# Patient Record
Sex: FEMALE | Race: WHITE | NOT HISPANIC OR LATINO | Employment: UNEMPLOYED | URBAN - METROPOLITAN AREA
[De-identification: names, ages, dates, MRNs, and addresses within clinical notes are randomized per-mention and may not be internally consistent; named-entity substitution may affect disease eponyms.]

---

## 2018-04-18 PROBLEM — I50.22 CHF (CONGESTIVE HEART FAILURE), NYHA CLASS III, CHRONIC, SYSTOLIC: Status: ACTIVE | Noted: 2018-04-18

## 2018-04-18 PROBLEM — I25.118 CORONARY ARTERY DISEASE OF NATIVE ARTERY OF NATIVE HEART WITH STABLE ANGINA PECTORIS: Status: ACTIVE | Noted: 2018-04-18

## 2018-04-18 PROBLEM — I25.5 ISCHEMIC CARDIOMYOPATHY: Status: ACTIVE | Noted: 2018-04-18

## 2018-04-19 PROBLEM — Z78.9 KNOWLEDGE DEFICIT ABOUT THERAPEUTIC DIET: Status: ACTIVE | Noted: 2018-04-19

## 2018-04-20 PROBLEM — Z78.9 KNOWLEDGE DEFICIT ABOUT THERAPEUTIC DIET: Status: ACTIVE | Noted: 2018-04-20

## 2020-08-28 ENCOUNTER — TELEPHONE (OUTPATIENT)
Dept: SURGERY | Facility: CLINIC | Age: 85
End: 2020-08-28

## 2020-08-31 ENCOUNTER — OFFICE VISIT (OUTPATIENT)
Dept: SURGERY | Facility: CLINIC | Age: 85
End: 2020-08-31
Payer: MEDICARE

## 2020-08-31 ENCOUNTER — TELEPHONE (OUTPATIENT)
Dept: SURGERY | Facility: CLINIC | Age: 85
End: 2020-08-31

## 2020-08-31 VITALS
SYSTOLIC BLOOD PRESSURE: 118 MMHG | WEIGHT: 174.31 LBS | HEIGHT: 63 IN | BODY MASS INDEX: 30.89 KG/M2 | DIASTOLIC BLOOD PRESSURE: 56 MMHG

## 2020-08-31 DIAGNOSIS — K64.8 HEMORRHOIDS, INTERNAL, WITH BLEEDING: Primary | ICD-10-CM

## 2020-08-31 DIAGNOSIS — K64.4 EXTERNAL HEMORRHOIDS: ICD-10-CM

## 2020-08-31 PROCEDURE — 99203 PR OFFICE/OUTPT VISIT, NEW, LEVL III, 30-44 MIN: ICD-10-PCS | Mod: 25,S$PBB,, | Performed by: COLON & RECTAL SURGERY

## 2020-08-31 PROCEDURE — 99203 OFFICE O/P NEW LOW 30 MIN: CPT | Mod: 25,S$PBB,, | Performed by: COLON & RECTAL SURGERY

## 2020-08-31 PROCEDURE — 99213 OFFICE O/P EST LOW 20 MIN: CPT | Mod: PBBFAC | Performed by: COLON & RECTAL SURGERY

## 2020-08-31 PROCEDURE — 45330 DIAGNOSTIC SIGMOIDOSCOPY: CPT | Mod: S$PBB,,, | Performed by: COLON & RECTAL SURGERY

## 2020-08-31 PROCEDURE — 45330 DIAGNOSTIC SIGMOIDOSCOPY: CPT | Mod: PBBFAC | Performed by: COLON & RECTAL SURGERY

## 2020-08-31 PROCEDURE — 99999 PR PBB SHADOW E&M-EST. PATIENT-LVL III: ICD-10-PCS | Mod: PBBFAC,,, | Performed by: COLON & RECTAL SURGERY

## 2020-08-31 PROCEDURE — 99999 PR PBB SHADOW E&M-EST. PATIENT-LVL III: CPT | Mod: PBBFAC,,, | Performed by: COLON & RECTAL SURGERY

## 2020-08-31 PROCEDURE — 45330 PR SIGMOIDOSCOPY,DIAG2STIC: ICD-10-PCS | Mod: S$PBB,,, | Performed by: COLON & RECTAL SURGERY

## 2020-08-31 RX ORDER — CARVEDILOL 6.25 MG/1
6.25 TABLET ORAL 2 TIMES DAILY
COMMUNITY
Start: 2020-07-10

## 2020-08-31 RX ORDER — FAMOTIDINE 20 MG/1
TABLET, FILM COATED ORAL
COMMUNITY
Start: 2016-10-21 | End: 2020-08-31

## 2020-08-31 RX ORDER — ATORVASTATIN CALCIUM 20 MG/1
TABLET, FILM COATED ORAL
COMMUNITY
Start: 2016-11-30 | End: 2020-08-31

## 2020-08-31 RX ORDER — SACUBITRIL AND VALSARTAN 49; 51 MG/1; MG/1
1 TABLET, FILM COATED ORAL 2 TIMES DAILY
COMMUNITY
Start: 2020-07-25

## 2020-08-31 RX ORDER — FUROSEMIDE 40 MG/1
40 TABLET ORAL
COMMUNITY
Start: 2016-10-21

## 2020-08-31 RX ORDER — MEMANTINE HYDROCHLORIDE 10 MG/1
10 TABLET ORAL 2 TIMES DAILY
COMMUNITY
Start: 2020-06-03

## 2020-08-31 NOTE — TELEPHONE ENCOUNTER
Spoke with patient's daughter, informed her of appointment change to 9/14. Permission to overbook per Dr Jones .

## 2020-08-31 NOTE — PROGRESS NOTES
Subjective:       Patient ID: Heather Bryant is a 85 y.o. female.    Chief Complaint: Hemorrhoids    HPI  84 yo F, presents with complains of intermittent painless bright red blood per rectum after bowel movements.  She has a significant past cardiac history including CAD s/p CABG, CHF, carotid artery stenosis, aortic stenosis - on Plavix and ASA 81 mg/day.  + constipation.  Poor fiber intake.  No abdominal pain, unexplained weight loss, anorexia, recent change in bowel habits, or other constitutional symptoms.       Review of patient's allergies indicates:   Allergen Reactions    Codeine Hives    Iodinated contrast media Hives       Past Medical History:   Diagnosis Date    Abnormal EKG     Anxiety     Aortic valve stenosis     Arthritis     ASCVD (arteriosclerotic cardiovascular disease)     Carotid artery stenosis     Bilateral    CHF (congestive heart failure)     Coronary artery disease     Diabetes mellitus     Edema     Encounter for blood transfusion     HHD (hypertensive heart disease)     HLD (hyperlipidemia)     Hyperkalemia     Hypertension     IDDM (insulin dependent diabetes mellitus)     Ischemic cardiomyopathy     MI, old 2016    x2    Nonrheumatic mitral valve regurgitation     Nonrheumatic tricuspid valve regurgitation        Past Surgical History:   Procedure Laterality Date    BACK SURGERY      CARPAL TUNNEL RELEASE Bilateral     CATARACT EXTRACTION W/ INTRAOCULAR LENS  IMPLANT, BILATERAL      CHOLECYSTECTOMY      CORONARY ARTERY BYPASS GRAFT      x3    drug eluding coronary stent placement      HERNIA REPAIR      HYSTERECTOMY      SHOULDER ARTHROSCOPY W/ ROTATOR CUFF REPAIR Bilateral        Current Outpatient Medications   Medication Sig Dispense Refill    aspirin (ECOTRIN) 81 MG EC tablet Take 81 mg by mouth once daily.      atorvastatin (LIPITOR) 40 MG tablet Take 40 mg by mouth every evening.      clopidogrel (PLAVIX) 75 mg tablet Take 75 mg by mouth  once daily.      escitalopram oxalate (LEXAPRO) 20 MG tablet Take 20 mg by mouth once daily.      famotidine (PEPCID) 20 MG tablet Take 20 mg by mouth once daily.      furosemide (LASIX) 40 MG tablet Take 40 mg by mouth.      insulin aspart protamine-insulin aspart (NOVOLOG 70/30) 100 unit/mL (70-30) InPn pen Inject 24 Units into the skin 3 (three) times daily before meals.      insulin degludec 100 unit/mL (3 mL) InPn Inject 76 Units into the skin once daily.       isosorbide mononitrate (IMDUR) 30 MG 24 hr tablet Take 30 mg by mouth once daily.      MULTIVIT-IRON-MIN-FOLIC ACID 3,500-18-0.4 UNIT-MG-MG ORAL CHEW Take 1 capsule by mouth once daily.      nitroGLYCERIN (NITROSTAT) 0.4 MG SL tablet Place 0.4 mg under the tongue every 5 (five) minutes as needed for Chest pain. Do not exceed 3 doses in 15 min, call [facility] after third consecutive dose.      carvediloL (COREG) 6.25 MG tablet Take 6.25 mg by mouth 2 (two) times daily.      cephALEXin (KEFLEX) 250 MG capsule Take 1 capsule (250 mg total) by mouth every 8 (eight) hours. (Patient not taking: Reported on 8/31/2020)      ENTRESTO 49-51 mg per tablet Take 1 tablet by mouth 2 (two) times daily.      magnesium L-lactate (MAGTAB) 84 mg TbSR Take 84 mg by mouth 2 (two) times daily.      memantine (NAMENDA) 10 MG Tab Take 10 mg by mouth 2 (two) times daily.       No current facility-administered medications for this visit.        Family History   Problem Relation Age of Onset    Hypertension Mother     Cancer Father     Diabetes Father     Stroke Sister     Colon cancer Sister     Cancer Sister     Brain cancer Brother     Cancer Brother     Cancer Brother        Social History     Socioeconomic History    Marital status:      Spouse name: Not on file    Number of children: Not on file    Years of education: Not on file    Highest education level: Not on file   Occupational History    Not on file   Social Needs    Financial resource  strain: Not on file    Food insecurity     Worry: Not on file     Inability: Not on file    Transportation needs     Medical: Not on file     Non-medical: Not on file   Tobacco Use    Smoking status: Never Smoker    Smokeless tobacco: Never Used   Substance and Sexual Activity    Alcohol use: No    Drug use: Not on file    Sexual activity: Not on file   Lifestyle    Physical activity     Days per week: Not on file     Minutes per session: Not on file    Stress: Not on file   Relationships    Social connections     Talks on phone: Not on file     Gets together: Not on file     Attends Buddhism service: Not on file     Active member of club or organization: Not on file     Attends meetings of clubs or organizations: Not on file     Relationship status: Not on file   Other Topics Concern    Not on file   Social History Narrative    Not on file       Review of Systems   Constitutional: Negative for chills and fever.   HENT: Negative for congestion and sore throat.    Eyes: Negative for visual disturbance.   Respiratory: Negative for cough and shortness of breath.    Cardiovascular: Negative for chest pain and palpitations.   Gastrointestinal: Positive for anal bleeding and constipation. Negative for abdominal distention, abdominal pain, blood in stool, diarrhea, nausea, rectal pain and vomiting.   Endocrine: Negative for cold intolerance and heat intolerance.   Genitourinary: Negative for dysuria and frequency.   Musculoskeletal: Negative for arthralgias, back pain and neck pain.   Skin: Negative for rash.   Allergic/Immunologic: Negative for immunocompromised state.   Neurological: Negative for dizziness, light-headedness and headaches.   Hematological: Does not bruise/bleed easily.   Psychiatric/Behavioral: Negative for confusion. The patient is not nervous/anxious.        Objective:      Physical Exam  Constitutional:       Appearance: She is well-developed.   HENT:      Head: Normocephalic.   Pulmonary:       Effort: Pulmonary effort is normal. No respiratory distress.   Abdominal:      General: Bowel sounds are normal. There is no distension.      Palpations: Abdomen is soft. There is no mass.      Tenderness: There is no abdominal tenderness. There is no guarding or rebound.   Genitourinary:     Comments: Perineum - normal perianal skin, no mass, no fissure, small circumferential external hemorrhoids without thrombosis/inflammation, +internal hemorrhoids visible with effacement of anal verge  ATIYA - good tone, no mass, firm stool in rectal vault  Anoscopy - Grade 2-3 internal hemorrhoids with surface inflammation/friability  Musculoskeletal: Normal range of motion.   Skin:     General: Skin is warm and dry.   Neurological:      Mental Status: She is alert and oriented to person, place, and time.           Lab Results   Component Value Date    WBC 10.00 04/18/2018    HGB 11.7 (L) 04/18/2018    HCT 35.6 (L) 04/18/2018    MCV 81 (L) 04/18/2018     04/18/2018     BMP  Lab Results   Component Value Date     04/18/2018    K 5.6 (H) 04/18/2018     04/18/2018    CO2 23 04/18/2018    BUN 57 (H) 04/18/2018    CREATININE 1.70 (H) 04/18/2018    CALCIUM 9.5 04/18/2018    ESTGFRAFRICA 32 (A) 04/18/2018    EGFRNONAA 27 (A) 04/18/2018     CMP  Sodium   Date Value Ref Range Status   04/18/2018 140 136 - 145 mmol/L Final     Potassium   Date Value Ref Range Status   04/18/2018 5.6 (H) 3.5 - 5.1 mmol/L Final     Chloride   Date Value Ref Range Status   04/18/2018 101 95 - 110 mmol/L Final     CO2   Date Value Ref Range Status   04/18/2018 23 23 - 29 mmol/L Final     Glucose   Date Value Ref Range Status   04/18/2018 371 (H) 74 - 106 mg/dL Final     BUN, Bld   Date Value Ref Range Status   04/18/2018 57 (H) 7 - 17 mg/dL Final     Creatinine   Date Value Ref Range Status   04/18/2018 1.70 (H) 0.70 - 1.20 mg/dL Final     Calcium   Date Value Ref Range Status   04/18/2018 9.5 8.4 - 10.2 mg/dL Final     Total Protein    Date Value Ref Range Status   04/18/2018 7.7 6.3 - 8.2 g/dL Final     Albumin   Date Value Ref Range Status   04/18/2018 4.3 3.5 - 5.2 g/dL Final     Total Bilirubin   Date Value Ref Range Status   04/18/2018 0.6 0.2 - 1.3 mg/dL Final     Alkaline Phosphatase   Date Value Ref Range Status   04/18/2018 103 38 - 145 U/L Final     AST   Date Value Ref Range Status   04/18/2018 29 14 - 36 U/L Final     ALT   Date Value Ref Range Status   04/18/2018 30 10 - 44 U/L Final     eGFR if    Date Value Ref Range Status   04/18/2018 32 (A) >60 mL/min/1.73 m^2 Final     eGFR if non    Date Value Ref Range Status   04/18/2018 27 (A) >60 mL/min/1.73 m^2 Final     Comment:     Calculation used to obtain the estimated glomerular filtration  rate (eGFR) is the CKD-EPI equation.        No results found for: CEA        Assessment:       1. Hemorrhoids, internal, with bleeding    2. External hemorrhoids        Plan:   The internal hemorrhoids appear amenable to EBL, though she would need to be off her Plavix for several days prior.  I have tentatively scheduled her for a repeat office visit with EBL on 09/14/2020.  I asked her to be sure with her cardiologist that it is OK to hold her Plavix for 5 days preop, which she should do on 09/09/2020.    Rio Jones MD, FACS, FASCRS  Senior Staff Surgeon  Department of Colon & Rectal Surgery     This note was created using voice recognition software, and may contain some unrecognized transcriptional errors.

## 2020-09-10 ENCOUNTER — TELEPHONE (OUTPATIENT)
Dept: SURGERY | Facility: CLINIC | Age: 85
End: 2020-09-10

## 2020-09-10 NOTE — TELEPHONE ENCOUNTER
Spoke with patient's daughter, patient is currently admitted at [facility] for staph infection on arm, 9/14 appointment cancelled and rescheduled to 9/21. Daughter is concerned patient may not be well enough by then to tolerate visit. Encouraged daughter to call me back next Wednesday 9/16 with an update on her mother to reevaluate her condition and will reschedule or keep appointment based on update.

## 2020-09-16 ENCOUNTER — TELEPHONE (OUTPATIENT)
Dept: SURGERY | Facility: CLINIC | Age: 85
End: 2020-09-16

## 2020-09-16 NOTE — TELEPHONE ENCOUNTER
Spoke with patient's daughter, Divya. States that phone staff was able to help her rescheduled office visit and that patient is still in the hospital. Informed patient that we received a fax from her cardiologist that it is okay to hold her Plavix for 5 days prior to rubber band ligation and 7 days afterwards. If she keeps her October 22nd appointment she would need to hold the Plavix starting on October 17th. Stated understanding.

## 2020-10-22 ENCOUNTER — OFFICE VISIT (OUTPATIENT)
Dept: SURGERY | Facility: CLINIC | Age: 85
End: 2020-10-22
Payer: MEDICARE

## 2020-10-22 VITALS
TEMPERATURE: 98 F | BODY MASS INDEX: 31.02 KG/M2 | HEART RATE: 74 BPM | DIASTOLIC BLOOD PRESSURE: 68 MMHG | SYSTOLIC BLOOD PRESSURE: 106 MMHG | WEIGHT: 175.06 LBS | HEIGHT: 63 IN | RESPIRATION RATE: 18 BRPM

## 2020-10-22 DIAGNOSIS — K64.8 HEMORRHOIDS, INTERNAL, WITH BLEEDING: Primary | ICD-10-CM

## 2020-10-22 DIAGNOSIS — Z01.818 PRE-OP TESTING: Primary | ICD-10-CM

## 2020-10-22 PROCEDURE — 99999 PR PBB SHADOW E&M-EST. PATIENT-LVL IV: CPT | Mod: PBBFAC,,, | Performed by: COLON & RECTAL SURGERY

## 2020-10-22 PROCEDURE — 46221 LIGATION OF HEMORRHOID(S): CPT | Mod: S$PBB | Performed by: COLON & RECTAL SURGERY

## 2020-10-22 PROCEDURE — 99214 OFFICE O/P EST MOD 30 MIN: CPT | Mod: PBBFAC | Performed by: COLON & RECTAL SURGERY

## 2020-10-22 PROCEDURE — 99999 PR STA SHADOW: ICD-10-PCS | Mod: S$PBB,PBBFAC,, | Performed by: COLON & RECTAL SURGERY

## 2020-10-22 PROCEDURE — 99213 OFFICE O/P EST LOW 20 MIN: CPT | Mod: 25,S$PBB | Performed by: COLON & RECTAL SURGERY

## 2020-10-22 PROCEDURE — 99999 PR STA SHADOW: CPT | Mod: PBBFAC,,, | Performed by: COLON & RECTAL SURGERY

## 2020-10-22 RX ORDER — LISINOPRIL 20 MG/1
20 TABLET ORAL
COMMUNITY

## 2020-10-22 RX ORDER — ONDANSETRON 4 MG/1
TABLET, ORALLY DISINTEGRATING ORAL
COMMUNITY
Start: 2020-09-17

## 2020-10-22 RX ORDER — METOPROLOL TARTRATE 25 MG/1
25 TABLET, FILM COATED ORAL
COMMUNITY

## 2020-10-22 RX ORDER — INSULIN ASPART 100 [IU]/ML
INJECTION, SOLUTION INTRAVENOUS; SUBCUTANEOUS
COMMUNITY
Start: 2020-09-23 | End: 2021-03-04 | Stop reason: SDUPTHER

## 2020-10-22 RX ORDER — PATIROMER 8.4 G/1
POWDER, FOR SUSPENSION ORAL
COMMUNITY
Start: 2020-10-19

## 2020-10-22 RX ORDER — PANTOPRAZOLE SODIUM 40 MG/1
40 TABLET, DELAYED RELEASE ORAL DAILY
COMMUNITY
Start: 2020-09-10

## 2020-10-22 NOTE — LETTER
October 22, 2020      David Haines MD  [address] [place] [address]           [facility]  [address] [facility] [address]  Phone: [phone]          Patient: Heather Bryant   MR Number: [id number]   YOB: 1935   Date of Visit: 10/22/2020       Dear Dr Haines:    Thank you for referring Heather Bryant to me for evaluation. Attached you will find relevant portions of my assessment and plan of care.    If you have questions, please do not hesitate to call me. I look forward to following Heather Bryant along with you.    Sincerely,    Rio Jones MD    Enclosure  CC:  No Recipients    If you would like to receive this communication electronically, please contact [email or web address] or [phone] to request more information on [employer] access.    For providers and/or their staff who would like to refer a patient to [facility], please contact us through our one-stop-shop provider referral line, [facility] , at [phone].    If you feel you have received this communication in error or would no longer like to receive these types of communications, please e-mail [email or web address]

## 2020-10-22 NOTE — PROGRESS NOTES
Subjective:       Patient ID: Heather Bryant is a 85 y.o. female.    Chief Complaint: Hemorrhoids    HPI  86 yo F, seen 8/31/2020 with complains of intermittent painless bright red blood per rectum after bowel movements.  She has a significant past cardiac history including CAD s/p CABG, CHF, carotid artery stenosis, aortic stenosis - on Plavix and ASA 81 mg/day.  + constipation.  Poor fiber intake.  No abdominal pain, unexplained weight loss, anorexia, recent change in bowel habits, or other constitutional symptoms.   On exam she had large grade 2-3 internal hemorrhoids with significant mucosal inflammation/contact friability.  The internal hemorrhoids appeared amenable to EBL, though she would need to be off her Plavix for several days prior.  I tentatively scheduled her for a repeat office visit with EBL on 09/14/2020.  I asked her to be sure with her cardiologist that it is OK to hold her Plavix for 5 days preop, which she should do on 09/09/2020.    Unfortunately, she had to cancel this appointment because she was hospitalized with a staph infection in her arm that required surgery.  She has now recovered from this and returns today to undergo banding.  She is still experiencing intermittent rectal bleeding, though somewhat improved from prior.  She did get clearance from her cardiologist to hold her Plavix - she last took it 5 days ago.      Review of patient's allergies indicates:   Allergen Reactions    Codeine Hives    Iodinated contrast media Hives       Past Medical History:   Diagnosis Date    Abnormal EKG     Anxiety     Aortic valve stenosis     Arthritis     ASCVD (arteriosclerotic cardiovascular disease)     Carotid artery stenosis     Bilateral    CHF (congestive heart failure)     Coronary artery disease     Diabetes mellitus     Edema     Encounter for blood transfusion     HHD (hypertensive heart disease)     HLD (hyperlipidemia)     Hyperkalemia     Hypertension     IDDM  (insulin dependent diabetes mellitus)     Ischemic cardiomyopathy     MI, old 2016    x2    Nonrheumatic mitral valve regurgitation     Nonrheumatic tricuspid valve regurgitation        Past Surgical History:   Procedure Laterality Date    BACK SURGERY      CARPAL TUNNEL RELEASE Bilateral     CATARACT EXTRACTION W/ INTRAOCULAR LENS  IMPLANT, BILATERAL      CHOLECYSTECTOMY      CORONARY ARTERY BYPASS GRAFT      x3    drug eluding coronary stent placement      HERNIA REPAIR      HYSTERECTOMY      SHOULDER ARTHROSCOPY W/ ROTATOR CUFF REPAIR Bilateral        Current Outpatient Medications   Medication Sig Dispense Refill    aspirin (ECOTRIN) 81 MG EC tablet Take 81 mg by mouth once daily.      atorvastatin (LIPITOR) 40 MG tablet Take 40 mg by mouth every evening.      carvediloL (COREG) 6.25 MG tablet Take 6.25 mg by mouth 2 (two) times daily.      ENTRESTO 49-51 mg per tablet Take 1 tablet by mouth 2 (two) times daily.      escitalopram oxalate (LEXAPRO) 20 MG tablet Take 20 mg by mouth once daily.      famotidine (PEPCID) 20 MG tablet Take 20 mg by mouth once daily.      furosemide (LASIX) 40 MG tablet Take 40 mg by mouth.      insulin aspart protamine-insulin aspart (NOVOLOG 70/30) 100 unit/mL (70-30) InPn pen Inject 24 Units into the skin 3 (three) times daily before meals.      isosorbide mononitrate (IMDUR) 30 MG 24 hr tablet Take 30 mg by mouth once daily.      lisinopriL (PRINIVIL,ZESTRIL) 20 MG tablet Take 20 mg by mouth.      memantine (NAMENDA) 10 MG Tab Take 10 mg by mouth 2 (two) times daily.      metoprolol tartrate (LOPRESSOR) 25 MG tablet Take 25 mg by mouth.      MULTIVIT-IRON-MIN-FOLIC ACID 3,500-18-0.4 UNIT-MG-MG ORAL CHEW Take 1 capsule by mouth once daily.      nitroGLYCERIN (NITROSTAT) 0.4 MG SL tablet Place 0.4 mg under the tongue every 5 (five) minutes as needed for Chest pain. Do not exceed 3 doses in 15 min, call [facility] after third consecutive dose.      NOVOLOG  FLEXPEN U-100 INSULIN 100 unit/mL (3 mL) InPn pen INJECT 10 15 UNITS BEFORE MEALS PENDING MEAL SIZE SUBCUTANEOUS      ondansetron (ZOFRAN-ODT) 4 MG TbDL DISSOLVE ON TONGUE 1 TABLET EVERY 8 HOURS AS NEEDED FOR NAUSEA AND VOMITING      pantoprazole (PROTONIX) 40 MG tablet Take 40 mg by mouth once daily.      VELTASSA 8.4 gram PwPk DISSOLVE THE CONTENTS OF 1 PACKET IN 1/3 CUP OF  WATER AND DRINK ONCE A DAY      clopidogrel (PLAVIX) 75 mg tablet Take 75 mg by mouth once daily.      insulin degludec 100 unit/mL (3 mL) InPn Inject 76 Units into the skin once daily.       magnesium L-lactate (MAGTAB) 84 mg TbSR Take 84 mg by mouth 2 (two) times daily.       No current facility-administered medications for this visit.        Family History   Problem Relation Age of Onset    Hypertension Mother     Cancer Father     Diabetes Father     Stroke Sister     Colon cancer Sister     Cancer Sister     Brain cancer Brother     Cancer Brother     Cancer Brother        Social History     Socioeconomic History    Marital status:      Spouse name: Not on file    Number of children: Not on file    Years of education: Not on file    Highest education level: Not on file   Occupational History    Not on file   Social Needs    Financial resource strain: Not on file    Food insecurity     Worry: Not on file     Inability: Not on file    Transportation needs     Medical: Not on file     Non-medical: Not on file   Tobacco Use    Smoking status: Never Smoker    Smokeless tobacco: Never Used   Substance and Sexual Activity    Alcohol use: No    Drug use: Not on file    Sexual activity: Not on file   Lifestyle    Physical activity     Days per week: Not on file     Minutes per session: Not on file    Stress: Not on file   Relationships    Social connections     Talks on phone: Not on file     Gets together: Not on file     Attends Tenriism service: Not on file     Active member of club or organization: Not on  file     Attends meetings of clubs or organizations: Not on file     Relationship status: Not on file   Other Topics Concern    Not on file   Social History Narrative    Not on file       Review of Systems   Constitutional: Negative for chills and fever.   HENT: Negative for congestion and sore throat.    Eyes: Negative for visual disturbance.   Respiratory: Negative for cough and shortness of breath.    Cardiovascular: Negative for chest pain and palpitations.   Gastrointestinal: Positive for anal bleeding and constipation. Negative for abdominal distention, abdominal pain, blood in stool, diarrhea, nausea, rectal pain and vomiting.   Endocrine: Negative for cold intolerance and heat intolerance.   Genitourinary: Negative for dysuria and frequency.   Musculoskeletal: Negative for arthralgias, back pain and neck pain.   Skin: Negative for rash.   Allergic/Immunologic: Negative for immunocompromised state.   Neurological: Negative for dizziness, light-headedness and headaches.   Hematological: Does not bruise/bleed easily.   Psychiatric/Behavioral: Negative for confusion. The patient is not nervous/anxious.        Objective:      Physical Exam  Constitutional:       Appearance: She is well-developed.   HENT:      Head: Normocephalic.   Pulmonary:      Effort: Pulmonary effort is normal. No respiratory distress.   Abdominal:      General: Bowel sounds are normal. There is no distension.      Palpations: Abdomen is soft. There is no mass.      Tenderness: There is no abdominal tenderness. There is no guarding or rebound.   Genitourinary:     Comments: Perineum - normal perianal skin, no mass, no fissure, small circumferential external hemorrhoids without thrombosis/inflammation, +internal hemorrhoids visible with effacement of anal verge  ATIYA - good tone, no mass, firm stool in rectal vault  Anoscopy - Grade 2 internal hemorrhoids with improvement in surface inflammation/friability  Musculoskeletal: Normal range of  motion.   Skin:     General: Skin is warm and dry.   Neurological:      Mental Status: She is alert and oriented to person, place, and time.           Lab Results   Component Value Date    WBC 10.00 04/18/2018    HGB 11.7 (L) 04/18/2018    HCT 35.6 (L) 04/18/2018    MCV 81 (L) 04/18/2018     04/18/2018     BMP  Lab Results   Component Value Date     04/18/2018    K 5.6 (H) 04/18/2018     04/18/2018    CO2 23 04/18/2018    BUN 57 (H) 04/18/2018    CREATININE 1.70 (H) 04/18/2018    CALCIUM 9.5 04/18/2018    ESTGFRAFRICA 32 (A) 04/18/2018    EGFRNONAA 27 (A) 04/18/2018     CMP  Sodium   Date Value Ref Range Status   04/18/2018 140 136 - 145 mmol/L Final     Potassium   Date Value Ref Range Status   04/18/2018 5.6 (H) 3.5 - 5.1 mmol/L Final     Chloride   Date Value Ref Range Status   04/18/2018 101 95 - 110 mmol/L Final     CO2   Date Value Ref Range Status   04/18/2018 23 23 - 29 mmol/L Final     Glucose   Date Value Ref Range Status   04/18/2018 371 (H) 74 - 106 mg/dL Final     BUN, Bld   Date Value Ref Range Status   04/18/2018 57 (H) 7 - 17 mg/dL Final     Creatinine   Date Value Ref Range Status   04/18/2018 1.70 (H) 0.70 - 1.20 mg/dL Final     Calcium   Date Value Ref Range Status   04/18/2018 9.5 8.4 - 10.2 mg/dL Final     Total Protein   Date Value Ref Range Status   04/18/2018 7.7 6.3 - 8.2 g/dL Final     Albumin   Date Value Ref Range Status   04/18/2018 4.3 3.5 - 5.2 g/dL Final     Total Bilirubin   Date Value Ref Range Status   04/18/2018 0.6 0.2 - 1.3 mg/dL Final     Alkaline Phosphatase   Date Value Ref Range Status   04/18/2018 103 38 - 145 U/L Final     AST   Date Value Ref Range Status   04/18/2018 29 14 - 36 U/L Final     ALT   Date Value Ref Range Status   04/18/2018 30 10 - 44 U/L Final     eGFR if    Date Value Ref Range Status   04/18/2018 32 (A) >60 mL/min/1.73 m^2 Final     eGFR if non    Date Value Ref Range Status   04/18/2018 27 (A) >60  mL/min/1.73 m^2 Final     Comment:     Calculation used to obtain the estimated glomerular filtration  rate (eGFR) is the CKD-EPI equation.        No results found for: CEA        Assessment:       1. Hemorrhoids, internal, with bleeding        Plan:   PROCEDURE:  Anoscopy - Diagnostic - Internal Hemorrhoids   rdGrdrrdarddrderd:rd rd3rd With informed consent 2 rubber bands were applied at right anterior and left lateral positions.  The procedure was tolerated well.  The patient was given a handout which discussed their disease process, precautions, and instructions for follow-up and therapy.    High fiber diet (25 grams/day)  Increase fluid intake (at least 8 glasses of water/day)  Daily fiber supplement (Metamucil, Benefiber, Citrucel, Konsyl - PICK ONE)  Colace/MiraLax as needed for constipation   She is scheduled for a pain management procedure for her chronic back pain next week.  She can restart her Plavix when she is cleared to do so by the pain management physician after that procedure.      RTO prn.      Rio Jones MD, FACS, FASCRS  Senior Staff Surgeon  Department of Colon & Rectal Surgery     This note was created using voice recognition software, and may contain some unrecognized transcriptional errors.

## 2021-01-29 ENCOUNTER — TELEPHONE (OUTPATIENT)
Dept: SURGERY | Facility: CLINIC | Age: 86
End: 2021-01-29

## 2021-02-15 ENCOUNTER — TELEPHONE (OUTPATIENT)
Dept: SURGERY | Facility: CLINIC | Age: 86
End: 2021-02-15

## 2021-03-04 ENCOUNTER — OFFICE VISIT (OUTPATIENT)
Dept: SURGERY | Facility: CLINIC | Age: 86
End: 2021-03-04
Payer: MEDICARE

## 2021-03-04 VITALS
WEIGHT: 177.25 LBS | BODY MASS INDEX: 31.41 KG/M2 | RESPIRATION RATE: 18 BRPM | TEMPERATURE: 97 F | HEIGHT: 63 IN | HEART RATE: 68 BPM | DIASTOLIC BLOOD PRESSURE: 58 MMHG | SYSTOLIC BLOOD PRESSURE: 108 MMHG

## 2021-03-04 DIAGNOSIS — K64.4 EXTERNAL HEMORRHOIDS: ICD-10-CM

## 2021-03-04 DIAGNOSIS — K64.8 HEMORRHOIDS, INTERNAL, WITH BLEEDING: Primary | ICD-10-CM

## 2021-03-04 PROCEDURE — 99214 OFFICE O/P EST MOD 30 MIN: CPT | Mod: PBBFAC,25 | Performed by: COLON & RECTAL SURGERY

## 2021-03-04 PROCEDURE — 99999 PR STA SHADOW: CPT | Mod: PBBFAC,,, | Performed by: COLON & RECTAL SURGERY

## 2021-03-04 PROCEDURE — 99999 PR STA SHADOW: ICD-10-PCS | Mod: PBBFAC,,, | Performed by: COLON & RECTAL SURGERY

## 2021-03-04 PROCEDURE — 99999 PR PBB SHADOW E&M-EST. PATIENT-LVL IV: CPT | Mod: PBBFAC,,, | Performed by: COLON & RECTAL SURGERY

## 2021-03-04 PROCEDURE — 99213 OFFICE O/P EST LOW 20 MIN: CPT | Mod: 25,S$PBB | Performed by: COLON & RECTAL SURGERY

## 2021-03-04 PROCEDURE — 46221 LIGATION OF HEMORRHOID(S): CPT | Mod: PBBFAC | Performed by: COLON & RECTAL SURGERY

## 2021-03-04 RX ORDER — TORSEMIDE 20 MG/1
10 TABLET ORAL DAILY
COMMUNITY
Start: 2020-12-21

## 2021-11-09 ENCOUNTER — TELEPHONE (OUTPATIENT)
Dept: SURGERY | Facility: CLINIC | Age: 86
End: 2021-11-09
Payer: MEDICARE

## 2021-11-10 ENCOUNTER — TELEPHONE (OUTPATIENT)
Dept: SURGERY | Facility: CLINIC | Age: 86
End: 2021-11-10
Payer: MEDICARE

## 2021-12-02 ENCOUNTER — OFFICE VISIT (OUTPATIENT)
Dept: SURGERY | Facility: CLINIC | Age: 86
End: 2021-12-02
Payer: MEDICARE

## 2021-12-02 VITALS
RESPIRATION RATE: 16 BRPM | BODY MASS INDEX: 27.36 KG/M2 | DIASTOLIC BLOOD PRESSURE: 56 MMHG | HEART RATE: 70 BPM | HEIGHT: 64 IN | SYSTOLIC BLOOD PRESSURE: 110 MMHG | WEIGHT: 160.25 LBS

## 2021-12-02 DIAGNOSIS — K64.8 HEMORRHOIDS, INTERNAL, WITH BLEEDING: Primary | ICD-10-CM

## 2021-12-02 DIAGNOSIS — K64.4 EXTERNAL HEMORRHOIDS: ICD-10-CM

## 2021-12-02 PROCEDURE — 46600 DIAGNOSTIC ANOSCOPY SPX: CPT | Mod: PBBFAC | Performed by: COLON & RECTAL SURGERY

## 2021-12-02 PROCEDURE — 99213 OFFICE O/P EST LOW 20 MIN: CPT | Mod: PBBFAC | Performed by: COLON & RECTAL SURGERY

## 2021-12-02 PROCEDURE — 99999 PR PBB SHADOW E&M-EST. PATIENT-LVL III: ICD-10-PCS | Mod: PBBFAC,,, | Performed by: COLON & RECTAL SURGERY

## 2021-12-02 PROCEDURE — 99999 PR PBB SHADOW E&M-EST. PATIENT-LVL III: CPT | Mod: PBBFAC,,, | Performed by: COLON & RECTAL SURGERY

## 2021-12-02 PROCEDURE — 99999 PR STA SHADOW: CPT | Mod: PBBFAC,,, | Performed by: COLON & RECTAL SURGERY

## 2021-12-02 PROCEDURE — 99213 OFFICE O/P EST LOW 20 MIN: CPT | Mod: 25,S$PBB | Performed by: COLON & RECTAL SURGERY

## 2021-12-02 RX ORDER — INSULIN ASPART 100 [IU]/ML
INJECTION, SOLUTION INTRAVENOUS; SUBCUTANEOUS
COMMUNITY
Start: 2021-09-15

## 2021-12-02 RX ORDER — FLASH GLUCOSE SCANNING READER
EACH MISCELLANEOUS
COMMUNITY
Start: 2021-08-16

## 2021-12-02 RX ORDER — HYDROCORTISONE 25 MG/G
CREAM TOPICAL
COMMUNITY
Start: 2021-11-19

## 2021-12-02 RX ORDER — BLOOD SUGAR DIAGNOSTIC
STRIP MISCELLANEOUS
COMMUNITY
Start: 2021-10-08

## 2021-12-02 RX ORDER — ONDANSETRON 4 MG/1
4 TABLET, FILM COATED ORAL EVERY 8 HOURS PRN
COMMUNITY
Start: 2021-06-23

## 2021-12-02 RX ORDER — FLASH GLUCOSE SENSOR
KIT MISCELLANEOUS
COMMUNITY
Start: 2021-08-13

## 2021-12-02 RX ORDER — METOLAZONE 5 MG/1
TABLET ORAL
COMMUNITY
Start: 2021-06-11

## 2021-12-09 ENCOUNTER — OFFICE VISIT (OUTPATIENT)
Dept: SURGERY | Facility: CLINIC | Age: 86
End: 2021-12-09
Payer: MEDICARE

## 2021-12-09 VITALS
WEIGHT: 163.81 LBS | HEART RATE: 71 BPM | BODY MASS INDEX: 27.96 KG/M2 | HEIGHT: 64 IN | DIASTOLIC BLOOD PRESSURE: 50 MMHG | SYSTOLIC BLOOD PRESSURE: 102 MMHG | RESPIRATION RATE: 16 BRPM

## 2021-12-09 DIAGNOSIS — K64.4 EXTERNAL HEMORRHOIDS: ICD-10-CM

## 2021-12-09 DIAGNOSIS — K64.8 HEMORRHOIDS, INTERNAL, WITH BLEEDING: Primary | ICD-10-CM

## 2021-12-09 PROCEDURE — 99999 PR STA SHADOW: ICD-10-PCS | Mod: PBBFAC,,, | Performed by: COLON & RECTAL SURGERY

## 2021-12-09 PROCEDURE — 99213 OFFICE O/P EST LOW 20 MIN: CPT | Mod: PBBFAC,25 | Performed by: COLON & RECTAL SURGERY

## 2021-12-09 PROCEDURE — 99999 PR STA SHADOW: CPT | Mod: PBBFAC,,, | Performed by: COLON & RECTAL SURGERY

## 2021-12-09 PROCEDURE — 99212 OFFICE O/P EST SF 10 MIN: CPT | Mod: 25,S$PBB | Performed by: COLON & RECTAL SURGERY

## 2021-12-09 PROCEDURE — 46221 LIGATION OF HEMORRHOID(S): CPT | Mod: PBBFAC | Performed by: COLON & RECTAL SURGERY

## 2021-12-09 PROCEDURE — 99999 PR PBB SHADOW E&M-EST. PATIENT-LVL III: CPT | Mod: PBBFAC,,, | Performed by: COLON & RECTAL SURGERY

## 2022-04-11 NOTE — TELEPHONE ENCOUNTER
"Spoke with patient's daughter. Patient's daughter states that Dr Jones came "highly recommended" by Dr Gaviria and wishes to stay on Dr Jones's schedule for Monday. Future appointments can be made for [facility].   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  She will require sigmoidectomy however at this time she is still recovering from her recent ICU stay.  We will have her continue to recover and present in 6-8 weeks.  We will plan to schedule surgery at that time provided she has improved.